# Patient Record
(demographics unavailable — no encounter records)

---

## 2024-10-15 NOTE — HISTORY OF PRESENT ILLNESS
[de-identified] : 82 year old female with history of Splenic Marginal Zone Lymphoma s/p splenectomy,  BERNABE 2 + polycythemia vera getting routine phlebotomies presenting for follow up.\par  She has required no therapy for MZL.  Her phlebotomy need has been relatively modest. [FreeTextEntry1] : Phlebotomies PRN [de-identified] : remains w/o H/A or other neurologic c/o  PV- requiring infrequent phlebotomies - about 3x/yr Splenic marginal zone lymphoma - remains quiescent post splenectomy IgM kappa mooclonal gammopathy, stable no constitutional c/o

## 2024-10-15 NOTE — ASSESSMENT
[Palliative] : Goals of care discussed with patient: Palliative [Palliative Care Plan] : not applicable at this time [FreeTextEntry1] : 87 year old female with history of Splenic Marginal Zone Lymphoma s/p splenectomy, BERNABE 2+ Polycythemia vera getting routine phlebotomies  Has needed phlebotomy about 3-4 x/yr, last 3/19/24 with Hct 43.0  variable incr plts: c/w MPN, splenectomy, generally < 500K  check CBC in 8 wks, phlebotomize 500 cc if Hgb > 14 and/or Hct > 42  CBC results reviewed and d/w pt WBC 7.09, Hgb 15, Hct 47.6, Plt 352K  hypogammaglobulinemia without increase in respiratory infections, IgG persistently < 500 has no indications for IGRT to date  Phlebotomy today, 500 cc Check CMP, LDH, immunoprotein studies  RV 3 mos

## 2024-10-15 NOTE — HISTORY OF PRESENT ILLNESS
[de-identified] : 82 year old female with history of Splenic Marginal Zone Lymphoma s/p splenectomy,  BERNABE 2 + polycythemia vera getting routine phlebotomies presenting for follow up.\par  She has required no therapy for MZL.  Her phlebotomy need has been relatively modest. [FreeTextEntry1] : Phlebotomies PRN [de-identified] : remains w/o H/A or other neurologic c/o  PV- requiring infrequent phlebotomies - about 3x/yr Splenic marginal zone lymphoma - remains quiescent post splenectomy IgM kappa mooclonal gammopathy, stable no constitutional c/o

## 2025-04-24 NOTE — HISTORY OF PRESENT ILLNESS
[de-identified] : 82 year old female with history of Splenic Marginal Zone Lymphoma s/p splenectomy,  BERNABE 2 + polycythemia vera getting routine phlebotomies presenting for follow up.\par  She has required no therapy for MZL.  Her phlebotomy need has been relatively modest. [FreeTextEntry1] : Phlebotomies PRN [de-identified] : remains w/o H/A or other neurologic c/o  PV- requiring infrequent phlebotomies - about 3-4x/yr Splenic marginal zone lymphoma - has been quiescent post splenectomy IgM kappa monoclonal gammopathy, also stable for years no paraprotein related c/o hypogammaw/o incr. resp. infections no constitutional c/o

## 2025-04-24 NOTE — ASSESSMENT
[Palliative] : Goals of care discussed with patient: Palliative [Palliative Care Plan] : not applicable at this time [FreeTextEntry1] : 87 year old female with history of Splenic Marginal Zone Lymphoma s/p splenectomy,  BERNABE 2+ Polycythemia Vera getting routine phlebotomies  Has needed phlebotomy about 3-4 x/yr, last  with Hct 42.8 CBC results reviewed and d/w pt and dtr WBC 7.01, Hgb 13.3, Hct 43.3, Plt 356K  variable incr plts, recently < 400K: c/w MPN, splenectomy  plan phlebotomy 500 cc today check CBC in 8 wks, cont. to phlebotomize 500 cc if Hgb > 14 and/or Hct > 42  IgM paraprotein, stable M spike in 0.7 range hypogammaglobulinemia without increase in respiratory infections, IgG persistently < 500 has no indications for IGRT to date  Check CMP, LDH, immunoprotein studies  RV 4 mos